# Patient Record
Sex: MALE | Race: OTHER | Employment: FULL TIME | URBAN - METROPOLITAN AREA
[De-identification: names, ages, dates, MRNs, and addresses within clinical notes are randomized per-mention and may not be internally consistent; named-entity substitution may affect disease eponyms.]

---

## 2019-11-29 ENCOUNTER — OFFICE VISIT (OUTPATIENT)
Dept: URGENT CARE | Facility: CLINIC | Age: 54
End: 2019-11-29
Payer: COMMERCIAL

## 2019-11-29 VITALS
OXYGEN SATURATION: 98 % | SYSTOLIC BLOOD PRESSURE: 153 MMHG | BODY MASS INDEX: 27.4 KG/M2 | DIASTOLIC BLOOD PRESSURE: 97 MMHG | HEART RATE: 122 BPM | TEMPERATURE: 101 F | HEIGHT: 69 IN | WEIGHT: 185 LBS | RESPIRATION RATE: 15 BRPM

## 2019-11-29 DIAGNOSIS — A04.9 BACTERIAL GASTROENTERITIS: Primary | ICD-10-CM

## 2019-11-29 DIAGNOSIS — R50.9 FEVER, UNSPECIFIED FEVER CAUSE: ICD-10-CM

## 2019-11-29 LAB
CTP QC/QA: YES
FLUAV AG NPH QL: NEGATIVE
FLUBV AG NPH QL: NEGATIVE

## 2019-11-29 PROCEDURE — 99203 PR OFFICE/OUTPT VISIT, NEW, LEVL III, 30-44 MIN: ICD-10-PCS | Mod: S$GLB,,, | Performed by: NURSE PRACTITIONER

## 2019-11-29 PROCEDURE — 87804 INFLUENZA ASSAY W/OPTIC: CPT | Mod: QW,S$GLB,, | Performed by: NURSE PRACTITIONER

## 2019-11-29 PROCEDURE — 87804 POCT INFLUENZA A/B: ICD-10-PCS | Mod: QW,S$GLB,, | Performed by: NURSE PRACTITIONER

## 2019-11-29 PROCEDURE — 99203 OFFICE O/P NEW LOW 30 MIN: CPT | Mod: S$GLB,,, | Performed by: NURSE PRACTITIONER

## 2019-11-29 RX ORDER — METOPROLOL SUCCINATE 50 MG/1
TABLET, EXTENDED RELEASE ORAL
Refills: 5 | COMMUNITY
Start: 2019-11-09

## 2019-11-29 RX ORDER — ONDANSETRON 4 MG/1
4 TABLET, ORALLY DISINTEGRATING ORAL EVERY 12 HOURS PRN
Qty: 20 TABLET | Refills: 0 | Status: SHIPPED | OUTPATIENT
Start: 2019-11-29

## 2019-11-29 RX ORDER — AMLODIPINE BESYLATE 5 MG/1
TABLET ORAL
Refills: 5 | COMMUNITY
Start: 2019-11-09

## 2019-11-29 RX ORDER — CIPROFLOXACIN 500 MG/1
500 TABLET ORAL 2 TIMES DAILY
Qty: 14 TABLET | Refills: 0 | Status: SHIPPED | OUTPATIENT
Start: 2019-11-29 | End: 2019-12-06

## 2019-11-29 NOTE — PROGRESS NOTES
"Subjective:       Patient ID: Joselito Beasley is a 53 y.o. male.    Vitals:  height is 5' 9" (1.753 m) and weight is 83.9 kg (185 lb). His oral temperature is 101.2 °F (38.4 °C) (abnormal). His blood pressure is 153/97 (abnormal) and his pulse is 122 (abnormal). His respiration is 15 and oxygen saturation is 98%.     Chief Complaint: Abdominal Pain    Patient states to no eat any seafood or meats.  Patient had some shrimp around 8:30PM and woke up this mornings with diarrhea.      Provider note begins below:    Patient states he is vegetarian and ate a "seafood" salad last night (shrimp, fish clams? He is unsure of exactly which seafood).  Woke this AM with fever, nausea, vomiting and diarrhea.  He took 1,000 mg acetaminophen prior to arrival ( 390 minutes).  States has had flu vac this season.    Abdominal Pain   This is a new problem. The current episode started today. The problem has been gradually worsening. Associated symptoms include diarrhea, a fever and nausea. Pertinent negatives include no constipation, dysuria or vomiting. There is no history of abdominal surgery.       Constitution: Positive for chills and fever. Negative for appetite change and sweating.   Cardiovascular: Negative for chest pain.   Respiratory: Negative for shortness of breath.    Gastrointestinal: Positive for abdominal pain, nausea and diarrhea. Negative for abdominal trauma, abdominal bloating, history of abdominal surgery, vomiting, constipation, dark colored stools and heartburn.   Genitourinary: Negative for dysuria and pelvic pain.   Musculoskeletal: Negative for back pain.       Objective:      Physical Exam   Constitutional: He is oriented to person, place, and time. He appears well-developed and well-nourished.  Non-toxic appearance. He does not have a sickly appearance. He appears ill. No distress.   HENT:   Head: Normocephalic and atraumatic.   Right Ear: External ear normal.   Left Ear: External ear normal.   Nose: Nose " normal.   Mouth/Throat: Mucous membranes are normal.   Eyes: Conjunctivae and lids are normal.   Neck: Trachea normal and full passive range of motion without pain. Neck supple.   Cardiovascular: Normal rate, regular rhythm and normal heart sounds.   Pulmonary/Chest: Effort normal and breath sounds normal. No stridor. No respiratory distress. He has no wheezes.   Abdominal: Soft. Normal appearance and bowel sounds are normal. He exhibits no distension, no abdominal bruit, no pulsatile midline mass and no mass. There is no hepatosplenomegaly. There is no tenderness. There is no rigidity, no rebound and no guarding.   Musculoskeletal: Normal range of motion. He exhibits no edema.   Neurological: He is alert and oriented to person, place, and time. He has normal strength.   Skin: Skin is warm, dry, intact, not diaphoretic and not pale.   Psychiatric: He has a normal mood and affect. His speech is normal and behavior is normal. Judgment and thought content normal. Cognition and memory are normal.   Nursing note and vitals reviewed.    Results for orders placed or performed in visit on 11/29/19   POCT Influenza A/B   Result Value Ref Range    Rapid Influenza A Ag Negative Negative    Rapid Influenza B Ag Negative Negative     Acceptable Yes            Assessment:       1. Bacterial gastroenteritis    2. Fever, unspecified fever cause        Plan:         Bacterial gastroenteritis  -     ciprofloxacin HCl (CIPRO) 500 MG tablet; Take 1 tablet (500 mg total) by mouth 2 (two) times daily. for 7 days  Dispense: 14 tablet; Refill: 0    Fever, unspecified fever cause  -     POCT Influenza A/B  -     ondansetron (ZOFRAN-ODT) 4 MG TbDL; Take 1 tablet (4 mg total) by mouth every 12 (twelve) hours as needed.  Dispense: 20 tablet; Refill: 0      Patient Instructions       Bacterial Gastroenteritis  Gastroenteritis is commonly called the stomach flu. It is an inflammation of the gastrointestinal tract, which includes  your stomach and intestines. Most cases of gastroenteritis are caused by viruses. Bacterial gastroenteritis (caused by bacteria) usually causes severe symptoms. It can even be fatal. This sheet tells you more about bacterial gastroenteritis, how it can be prevented, and how to care for it.     Washing your hands carefully and often is one of the best ways to prevent the spread of bacteria that cause gastroenteritis.   How does bacterial gastroenteritis spread?  · Contaminated food or water. Youre most likely to get gastroenteritis by consuming food or water containing harmful bacteria (such as salmonella, Campylobacter, and E Coli). Food can become contaminated when food handlers dont wash their hands or when food isnt stored, handled, or cooked properly.  · Fecal-oral route. People with bacterial gastroenteritis have harmful bacteria in their stool. When they dont wash their hands well after using the bathroom, they can spread the germs to objects. If you touch the same objects, you can  the germs on your hands and transfer them to your mouth.  What are the symptoms of bacterial gastroenteritis?  Many kinds of bacteria cause gastroenteritis. So, symptoms can vary. In some types of gastroenteritis, symptoms come on quickly. In others, they dont appear for 24 to 48 hours. Symptoms can range from mild to severe and may include:  · Watery diarrhea  · Nausea and vomiting  · Fever and chills  · Belly (abdominal) pain  · Blood in the stool (in severe cases)  How is bacterial gastroenteritis diagnosed?  Your healthcare provider will take a complete health history. Be sure to mention any recent trips and what you ate before you became ill. Keep in mind that symptoms may not appear for a day or 2 after you become infected. You may be asked to provide a sample of your stool. This is sent to a lab for testing. Dont forget to check with your provider or hospital emergency department to learn the test results. In  some cases, you will be asked to see your provider for follow-up care.  How is bacterial gastroenteritis treated?  · Bacterial gastroenteritis often goes away without treatment. In some cases, symptoms are gone in a day or 2. In others, symptoms linger for weeks. In certain cases, it can take months for your bowels to return to normal.  · Replacing fluids lost through diarrhea and vomiting is important for a full recovery. If you are very dehydrated, you may need fluids through an IV (intravenous) line in the hospital.  · Medicines that slow diarrhea may be prescribed. It depends on what your provider thinks is causing your symptoms. But these medicines can make your illness last longer.  · Your healthcare provider will prescribe antibiotics only if your symptoms are caused by certain types of bacteria.  · You may be admitted to the hospital if your symptoms are very severe.  Easing symptoms of bacterial gastroenteritis  In most cases, bacterial gastroenteritis is treated at home. To ease symptoms and prevent complications:  · Get plenty of rest.  · Drink lots of liquids to replace water lost through diarrhea and vomiting. Plain water, clear soups, and electrolyte solutions are best. (You can find electrolyte solutions in most pharmacies.) Avoid fizzy (carbonated) drinks, alcohol, coffee, tea, perez, milk, and fruit juice. These can make symptoms worse. If nausea and vomiting make it hard for you to drink, try sucking on ice chips.  · Eat according to your healthcare providers instructions.  · Until the diarrhea clears up, avoid eating fruit and all dairy except yogurt. They can make diarrhea worse.  Preventing bacterial gastroenteritis at home  · Always wash your hands well before preparing food and after handling raw meat and poultry.  · Wash all raw fruits and vegetables--even packaged ones--with a scrub brush or vegetable wash.  · Use one cutting board just for meat. Wash all cutting boards and utensils in hot,  soapy water after use. Clean kitchen counters with bleach or disinfectant.  · Cook meats to a safe temperature to kill bacteria that may be present in the meat. Use a food thermometer when cooking. Follow these temperature guidelines:  ¨ Cook ground meat (beef, veal, pork, lamb) and meat mixtures to at least 160°F (71°C).  ¨ Cook fresh beef, veal, lamb, and pork (steak, roasts, chops) to at least 145°F (63°C).  ¨ Cook poultry (including ground turkey and chicken) to an internal temperature of at least 165°F (74°C).  · Wash your hands well after changing diapers. Dispose of diapers carefully so bacteria doesn't spread.  · Wash your hands well before and after contact with someone who is ill. Use soap and water or an alcohol-based hand gel containing at least 60% alcohol.  · Wear gloves when handling clothing, bed linens, or towels belonging to a sick person. Discard the gloves after each use. Then wash your hands well. Wash bed linens and other personal items separately in hot water with detergent and liquid bleach.  Preventing gastroenteritis in healthcare settings  Many hospitals and nursing homes take these steps to help prevent the spread of gastroenteritis:  · Handwashing. Healthcare workers wash their hands well with soap and water or use an alcohol-based hand  before and after touching someone. They also wash their hands after touching any surface that may be contaminated.  · Protective clothing. Healthcare workers wear gloves and sometimes gowns when working with people who have gastroenteritis. They remove these items before leaving the room.  · Private rooms. People with bacterial gastroenteritis are placed in private rooms or share a room with others who have the same infection.  · Safe food handling. Kitchen workers wash their hands often, cook foods properly, and disinfect all work surfaces.  Tips for good handwashing  Wash your hands often, and always after using the bathroom, playing with pets,  and before eating or preparing food. Clean the whole hand, under your nails, between your fingers, and up the wrists:  · Wash for at least 15 seconds. Dont just wipe. Scrub well.  · Rinse, letting the water run down your fingers, not up your wrists.  · Dry your hands well. Use a paper towel to turn off the faucet and open the door.  Using alcohol-based hand gels  When your hands arent visibly dirty, an alcohol-based hand gel containing at least 60% alcohol is a good choice.  · Squeeze about a tablespoon of gel into the palm of one hand.  · Rub your hands together briskly, cleaning the backs of your hands, the palms, between your fingers, and up the wrists.  · Rub until the gel is gone and your hands are completely dry.  When to seek medical care  · Your symptoms get worse  · Blood in your stool or your stools look black  · You have signs of dehydration, such as a dry mouth, intense thirst, and little or no urine   Date Last Reviewed: 1/1/2017  © 2732-6653 [employer]. [address] [place] [address]. All rights reserved. This information is not intended as a substitute for professional medical care. Always follow your healthcare professional's instructions.

## 2019-11-29 NOTE — PATIENT INSTRUCTIONS
Bacterial Gastroenteritis  Gastroenteritis is commonly called the stomach flu. It is an inflammation of the gastrointestinal tract, which includes your stomach and intestines. Most cases of gastroenteritis are caused by viruses. Bacterial gastroenteritis (caused by bacteria) usually causes severe symptoms. It can even be fatal. This sheet tells you more about bacterial gastroenteritis, how it can be prevented, and how to care for it.     Washing your hands carefully and often is one of the best ways to prevent the spread of bacteria that cause gastroenteritis.   How does bacterial gastroenteritis spread?  · Contaminated food or water. Youre most likely to get gastroenteritis by consuming food or water containing harmful bacteria (such as salmonella, Campylobacter, and E Coli). Food can become contaminated when food handlers dont wash their hands or when food isnt stored, handled, or cooked properly.  · Fecal-oral route. People with bacterial gastroenteritis have harmful bacteria in their stool. When they dont wash their hands well after using the bathroom, they can spread the germs to objects. If you touch the same objects, you can  the germs on your hands and transfer them to your mouth.  What are the symptoms of bacterial gastroenteritis?  Many kinds of bacteria cause gastroenteritis. So, symptoms can vary. In some types of gastroenteritis, symptoms come on quickly. In others, they dont appear for 24 to 48 hours. Symptoms can range from mild to severe and may include:  · Watery diarrhea  · Nausea and vomiting  · Fever and chills  · Belly (abdominal) pain  · Blood in the stool (in severe cases)  How is bacterial gastroenteritis diagnosed?  Your healthcare provider will take a complete health history. Be sure to mention any recent trips and what you ate before you became ill. Keep in mind that symptoms may not appear for a day or 2 after you become infected. You may be asked to provide a sample of your  stool. This is sent to a lab for testing. Dont forget to check with your provider or hospital emergency department to learn the test results. In some cases, you will be asked to see your provider for follow-up care.  How is bacterial gastroenteritis treated?  · Bacterial gastroenteritis often goes away without treatment. In some cases, symptoms are gone in a day or 2. In others, symptoms linger for weeks. In certain cases, it can take months for your bowels to return to normal.  · Replacing fluids lost through diarrhea and vomiting is important for a full recovery. If you are very dehydrated, you may need fluids through an IV (intravenous) line in the hospital.  · Medicines that slow diarrhea may be prescribed. It depends on what your provider thinks is causing your symptoms. But these medicines can make your illness last longer.  · Your healthcare provider will prescribe antibiotics only if your symptoms are caused by certain types of bacteria.  · You may be admitted to the hospital if your symptoms are very severe.  Easing symptoms of bacterial gastroenteritis  In most cases, bacterial gastroenteritis is treated at home. To ease symptoms and prevent complications:  · Get plenty of rest.  · Drink lots of liquids to replace water lost through diarrhea and vomiting. Plain water, clear soups, and electrolyte solutions are best. (You can find electrolyte solutions in most pharmacies.) Avoid fizzy (carbonated) drinks, alcohol, coffee, tea, perez, milk, and fruit juice. These can make symptoms worse. If nausea and vomiting make it hard for you to drink, try sucking on ice chips.  · Eat according to your healthcare providers instructions.  · Until the diarrhea clears up, avoid eating fruit and all dairy except yogurt. They can make diarrhea worse.  Preventing bacterial gastroenteritis at home  · Always wash your hands well before preparing food and after handling raw meat and poultry.  · Wash all raw fruits and  vegetables--even packaged ones--with a scrub brush or vegetable wash.  · Use one cutting board just for meat. Wash all cutting boards and utensils in hot, soapy water after use. Clean kitchen counters with bleach or disinfectant.  · Cook meats to a safe temperature to kill bacteria that may be present in the meat. Use a food thermometer when cooking. Follow these temperature guidelines:  ¨ Cook ground meat (beef, veal, pork, lamb) and meat mixtures to at least 160°F (71°C).  ¨ Cook fresh beef, veal, lamb, and pork (steak, roasts, chops) to at least 145°F (63°C).  ¨ Cook poultry (including ground turkey and chicken) to an internal temperature of at least 165°F (74°C).  · Wash your hands well after changing diapers. Dispose of diapers carefully so bacteria doesn't spread.  · Wash your hands well before and after contact with someone who is ill. Use soap and water or an alcohol-based hand gel containing at least 60% alcohol.  · Wear gloves when handling clothing, bed linens, or towels belonging to a sick person. Discard the gloves after each use. Then wash your hands well. Wash bed linens and other personal items separately in hot water with detergent and liquid bleach.  Preventing gastroenteritis in healthcare settings  Many hospitals and nursing homes take these steps to help prevent the spread of gastroenteritis:  · Handwashing. Healthcare workers wash their hands well with soap and water or use an alcohol-based hand  before and after touching someone. They also wash their hands after touching any surface that may be contaminated.  · Protective clothing. Healthcare workers wear gloves and sometimes gowns when working with people who have gastroenteritis. They remove these items before leaving the room.  · Private rooms. People with bacterial gastroenteritis are placed in private rooms or share a room with others who have the same infection.  · Safe food handling. Kitchen workers wash their hands often, cook  foods properly, and disinfect all work surfaces.  Tips for good handwashing  Wash your hands often, and always after using the bathroom, playing with pets, and before eating or preparing food. Clean the whole hand, under your nails, between your fingers, and up the wrists:  · Wash for at least 15 seconds. Dont just wipe. Scrub well.  · Rinse, letting the water run down your fingers, not up your wrists.  · Dry your hands well. Use a paper towel to turn off the faucet and open the door.  Using alcohol-based hand gels  When your hands arent visibly dirty, an alcohol-based hand gel containing at least 60% alcohol is a good choice.  · Squeeze about a tablespoon of gel into the palm of one hand.  · Rub your hands together briskly, cleaning the backs of your hands, the palms, between your fingers, and up the wrists.  · Rub until the gel is gone and your hands are completely dry.  When to seek medical care  · Your symptoms get worse  · Blood in your stool or your stools look black  · You have signs of dehydration, such as a dry mouth, intense thirst, and little or no urine   Date Last Reviewed: 1/1/2017  © 4479-5960 [employer]. [address] [place] [address], [place] [address]. All rights reserved. This information is not intended as a substitute for professional medical care. Always follow your healthcare professional's instructions.

## 2019-12-02 ENCOUNTER — TELEPHONE (OUTPATIENT)
Dept: URGENT CARE | Facility: CLINIC | Age: 54
End: 2019-12-02